# Patient Record
Sex: MALE | Race: WHITE | Employment: FULL TIME | URBAN - METROPOLITAN AREA
[De-identification: names, ages, dates, MRNs, and addresses within clinical notes are randomized per-mention and may not be internally consistent; named-entity substitution may affect disease eponyms.]

---

## 2021-06-30 ENCOUNTER — OFFICE VISIT (OUTPATIENT)
Dept: SURGERY | Age: 76
End: 2021-06-30
Payer: MEDICARE

## 2021-06-30 VITALS
OXYGEN SATURATION: 98 % | HEART RATE: 80 BPM | DIASTOLIC BLOOD PRESSURE: 76 MMHG | BODY MASS INDEX: 32.06 KG/M2 | WEIGHT: 229 LBS | SYSTOLIC BLOOD PRESSURE: 128 MMHG | TEMPERATURE: 97.2 F | HEIGHT: 71 IN | RESPIRATION RATE: 18 BRPM

## 2021-06-30 DIAGNOSIS — K40.90 RIGHT INGUINAL HERNIA: Primary | ICD-10-CM

## 2021-06-30 PROCEDURE — G8432 DEP SCR NOT DOC, RNG: HCPCS | Performed by: SURGERY

## 2021-06-30 PROCEDURE — G8754 DIAS BP LESS 90: HCPCS | Performed by: SURGERY

## 2021-06-30 PROCEDURE — G8752 SYS BP LESS 140: HCPCS | Performed by: SURGERY

## 2021-06-30 PROCEDURE — 99204 OFFICE O/P NEW MOD 45 MIN: CPT | Performed by: SURGERY

## 2021-06-30 PROCEDURE — G8427 DOCREV CUR MEDS BY ELIG CLIN: HCPCS | Performed by: SURGERY

## 2021-06-30 PROCEDURE — G8536 NO DOC ELDER MAL SCRN: HCPCS | Performed by: SURGERY

## 2021-06-30 PROCEDURE — 1101F PT FALLS ASSESS-DOCD LE1/YR: CPT | Performed by: SURGERY

## 2021-06-30 PROCEDURE — G8417 CALC BMI ABV UP PARAM F/U: HCPCS | Performed by: SURGERY

## 2021-06-30 PROCEDURE — 3017F COLORECTAL CA SCREEN DOC REV: CPT | Performed by: SURGERY

## 2021-06-30 NOTE — PROGRESS NOTES
Salomon Glaser is a 76 y.o. male  Chief Complaint   Patient presents with    New Patient     right inguinal hernia         Is someone accompanying this pt? no    Is the patient using any DME equipment during OV? no        Vitals:    06/30/21 1037 06/30/21 1047   BP: 132/82 128/76   Pulse: 80    Resp: 18    Temp: 97.2 °F (36.2 °C)    SpO2: 98%    Weight: 229 lb (103.9 kg)    Height: 5' 11\" (1.803 m)         Depression Screening:  No flowsheet data found. Learning Assessment:  No flowsheet data found. Fall Risk  No flowsheet data found. Health Maintenance reviewed and discussed and ordered per Provider. Coordination of Care:  1. Have you been to the ER, urgent care clinic since your last visit? Hospitalized since your last visit? no    2. Have you seen or consulted any other health care providers outside of the [address] [place] [address] since your last visit? Include any pap smears or colon screening.  no

## 2021-06-30 NOTE — PATIENT INSTRUCTIONS
Inguinal Hernia: Care Instructions  Your Care Instructions     An inguinal hernia occurs when tissue bulges through a weak spot in your groin area. You may see or feel a tender bulge in the groin or scrotum. You may also have pain, pressure or burning, or a feeling that something has \"given way. \"  Hernias are caused by a weakness in the belly wall. The bulge or discomfort may occur after heavy lifting, straining, or coughing. Hernias do not heal on their own, and they tend to get worse over time. If your hernia does not bother you, you most likely can wait to have surgery. Your hernia may get worse, but it may not. In some cases, hernias that are small and painless may never need to be repaired. Follow-up care is a key part of your treatment and safety. Be sure to make and go to all appointments, and call your doctor if you are having problems. It's also a good idea to know your test results and keep a list of the medicines you take. How can you care for yourself at home? · Take pain medicines exactly as directed. ? If the doctor gave you a prescription medicine for pain, take it as prescribed. ? If you are not taking a prescription pain medicine, ask your doctor if you can take an over-the-counter medicine. · Use proper lifting techniques, and avoid heavy lifting if you can. To lift things more safely, bend your knees and let your arms and legs do the work. Keep your back straight, and do not bend over at the waist. Keep the load as close to your body as you can. Move your feet instead of turning or twisting your body. · Lose weight if you are overweight. · Include fruits, vegetables, legumes, and whole grains in your diet each day. These foods are high in fiber and will make it easier to avoid straining during bowel movements. · Do not smoke. Smoking can cause coughing, which can cause your hernia to bulge. If you need help quitting, talk to your doctor about stop-smoking programs and medicines. These can increase your chances of quitting for good. When should you call for help? Call your doctor now or seek immediate medical care if:    · You have new or worse belly pain.     · You are vomiting.     · You cannot pass stools or gas.     · You cannot push the hernia back into place with gentle pressure when you are lying down.     · The area over the hernia turns red or becomes tender. Watch closely for changes in your health, and be sure to contact your doctor if you have any problems. Where can you learn more? Go to http://ta-suri.info/  Enter P533 in the search box to learn more about \"Inguinal Hernia: Care Instructions. \"  Current as of: April 15, 2020               Content Version: 12.8  © 2006-2021 Healthwise, Incorporated. Care instructions adapted under license by [employer] (which disclaims liability or warranty for this information). If you have questions about a medical condition or this instruction, always ask your healthcare professional. [place] [address] any warranty or liability for your use of this information.

## 2021-06-30 NOTE — PROGRESS NOTES
[facility]  General Surgery    Subjective:      HPI: Patient is a very pleasant obese 329 kg/m² 49-year-old male with a past medical history remarkable for hypertension, dyslipidemia, benign prostatic hypertrophy status post transurethral resection of prostate, gastroesophageal reflux disease, prediabetes, lumbago and history of bilateral knee replacements for degenerative joint disease. The patient is referred today by [facility] for evaluation management of right inguinal hernia. Patient states that while he was on the golf course 3 weeks ago he was exiting a sand trap when the sand gave way from under his right foot as he lifted his left leg onto the greens. He felt a sharp pain in had a lot of discomfort in the area. Later in the evening he noticed a bulge. While he was visiting with the urology team to follow-up for his BPH and PSA check he was examined and found to have this right inguinal hernia. No imaging has been performed. The patient is a primary caregiver for his wife with suffer from cerebrovascular accident with left-sided hemiparesis and some short-term memory loss. They do not have any other family in the area.   Patient Active Problem List    Diagnosis Date Noted    Dyslipidemia 10/08/2012    Unspecified essential hypertension 10/08/2012    Personal history of other diseases of digestive system 10/08/2012    GERD (gastroesophageal reflux disease) 10/08/2012    Allergic rhinitis 10/08/2012    Lumbago 10/08/2012    BPH without obstruction/lower urinary tract symptoms 10/08/2012    Impaired fasting glucose 10/08/2012    Microscopic hematuria 10/08/2012    Pinched nerve 10/08/2012     Past Medical History:   Diagnosis Date    Allergic rhinitis     Borderline diabetes mellitus     BPH without obstruction/lower urinary tract symptoms     Dyslipidemia     GERD (gastroesophageal reflux disease)     Impaired fasting glucose     Lumbago     Microscopic hematuria  Personal history of other diseases of digestive system     Pinched nerve     Unspecified essential hypertension       Past Surgical History:   Procedure Laterality Date    HX KNEE REPLACEMENT Bilateral 04,05    HX LYMPHADENECTOMY  2012      No family history on file. Social History     Tobacco Use    Smoking status: Never Smoker    Smokeless tobacco: Never Used    Tobacco comment: cigar rarely   Substance Use Topics    Alcohol use: Yes     Comment: Occ      No Known Allergies    Prior to Admission medications    Medication Sig Start Date End Date Taking? Authorizing Provider   fluticasone propionate (FLONASE) 50 mcg/actuation nasal spray 1 Spray daily. 8/17/20  Yes Provider, Historical   mupirocin (BACTROBAN) 2 % ointment  6/1/21  Yes Provider, Historical   pantoprazole (PROTONIX) 40 mg tablet Take 40 mg by mouth daily. 8/5/19  Yes Provider, Historical   telmisartan-hydroCHLOROthiazide (MICARDIS HCT) 80-25 mg per tablet  3/6/20  Yes Provider, Historical   Januvia 50 mg tablet  2/20/20  Yes Provider, Historical   naproxen (NAPROSYN) 500 mg tablet Take 500 mg by mouth two (2) times daily as needed. 8/5/19  Yes Provider, Historical   fexofenadine (ALLEGRA) 180 mg tablet Take 180 mg by mouth daily as needed. 9/16/19  Yes Provider, Historical   diclofenac (VOLTAREN) 1 % gel Apply 2 g to affected area four (4) times daily. 11/18/16  Yes Provider, Historical   cloNIDine HCL (CATAPRES) 0.1 mg tablet Take 0.1 mg by mouth two (2) times a day. 1/21/20  Yes Provider, Historical   metformin (GLUCOPHAGE) 1,000 mg tablet Take 1,000 mg by mouth daily. Yes Provider, Historical   atorvastatin (LIPITOR) 40 mg tablet Take 40 mg by mouth daily. Yes Provider, Historical   montelukast (SINGULAIR) 10 mg tablet Take 10 mg by mouth daily. Yes Provider, Historical   PV W-O SOFI/FERROUS FUMARATE/FA (M-VIT PO) Take  by mouth. Yes Provider, Historical   ascorbic acid (VITAMIN C) 500 mg tablet Take  by mouth.      Yes Provider, Historical   aspirin 81 mg tablet Take 81 mg by mouth. Yes Provider, Historical   finasteride (PROSCAR) 5 mg tablet Take 5 mg by mouth daily. Patient not taking: Reported on 6/14/2021    Provider, Historical   glucosamine sulfate (GLUCOSAMINE) 500 mg tablet Take  by mouth. Patient not taking: Reported on 6/14/2021    Provider, Historical       Review of Systems:    14 systems were reviewed. The results are as above in the HPI and otherwise negative. Objective:     Vitals:    06/30/21 1037 06/30/21 1047   BP: 132/82 128/76   Pulse: 80    Resp: 18    Temp: 97.2 °F (36.2 °C)    SpO2: 98%    Weight: 103.9 kg (229 lb)    Height: 5' 11\" (1.803 m)        Physical Exam:  GENERAL: alert, cooperative, no distress, appears stated age,   EYE: conjunctivae/corneas clear. PERRL, EOM's intact. THROAT & NECK: normal and no erythema or exudates noted. ,    LYMPHATIC: Cervical, supraclavicular, and axillary nodes normal. ,   LUNG: clear to auscultation bilaterally,   HEART: regular rate and rhythm, S1, S2 normal, no murmur, click, rub or gallop,   ABDOMEN: soft, non-tender. Bowel sounds normal. No masses,  no organomegaly,   Genitalia: Both testes are descended into the scrotum. A hernia defect is palpable with cough on the right. There are some bulging on the left but no clear hernia defect with cough. EXTREMITIES:  extremities normal, atraumatic, no cyanosis or edema,   SKIN: Normal.,   NEUROLOGIC: AOx3. Cranial nerves 2-12 and sensation grossly intact. ,     Data Review:  to be done    Mr. Jus Soto has a reminder for a \"due or due soon\" health maintenance. I have asked that he contact his primary care provider for follow-up on this health maintenance. Impression:     · Patient with right inguinal hernia which is symptomatic.     Plan:     · CT pelvis with IV contrast  · Follow-up in 2 weeks    Signed By: Minal Mendez MD     June 30, 2021

## 2021-07-09 ENCOUNTER — HOSPITAL ENCOUNTER (OUTPATIENT)
Dept: CT IMAGING | Age: 76
Discharge: HOME | End: 2021-07-09
Attending: SURGERY
Payer: MEDICARE

## 2021-07-09 DIAGNOSIS — K40.90 RIGHT INGUINAL HERNIA: ICD-10-CM

## 2021-07-09 LAB — CREAT UR-MCNC: 1.4 MG/DL (ref 0.6–1.3)

## 2021-07-09 PROCEDURE — 72193 CT PELVIS W/DYE: CPT

## 2021-07-09 PROCEDURE — 82565 ASSAY OF CREATININE: CPT

## 2021-07-09 PROCEDURE — 74011000636 HC RX REV CODE- 636: Performed by: SURGERY

## 2021-07-09 RX ADMIN — IOPAMIDOL 80 ML: 612 INJECTION, SOLUTION INTRAVENOUS at 14:36

## 2021-07-13 ENCOUNTER — OFFICE VISIT (OUTPATIENT)
Dept: SURGERY | Age: 76
End: 2021-07-13
Payer: MEDICARE

## 2021-07-13 VITALS
TEMPERATURE: 98 F | BODY MASS INDEX: 32.9 KG/M2 | SYSTOLIC BLOOD PRESSURE: 120 MMHG | RESPIRATION RATE: 18 BRPM | OXYGEN SATURATION: 98 % | HEART RATE: 76 BPM | DIASTOLIC BLOOD PRESSURE: 80 MMHG | WEIGHT: 235 LBS | HEIGHT: 71 IN

## 2021-07-13 DIAGNOSIS — K40.90 RIGHT INGUINAL HERNIA: Primary | ICD-10-CM

## 2021-07-13 PROCEDURE — 3017F COLORECTAL CA SCREEN DOC REV: CPT | Performed by: SURGERY

## 2021-07-13 PROCEDURE — G8432 DEP SCR NOT DOC, RNG: HCPCS | Performed by: SURGERY

## 2021-07-13 PROCEDURE — G8427 DOCREV CUR MEDS BY ELIG CLIN: HCPCS | Performed by: SURGERY

## 2021-07-13 PROCEDURE — 99215 OFFICE O/P EST HI 40 MIN: CPT | Performed by: SURGERY

## 2021-07-13 PROCEDURE — G8754 DIAS BP LESS 90: HCPCS | Performed by: SURGERY

## 2021-07-13 PROCEDURE — G8536 NO DOC ELDER MAL SCRN: HCPCS | Performed by: SURGERY

## 2021-07-13 PROCEDURE — 1101F PT FALLS ASSESS-DOCD LE1/YR: CPT | Performed by: SURGERY

## 2021-07-13 PROCEDURE — G8752 SYS BP LESS 140: HCPCS | Performed by: SURGERY

## 2021-07-13 PROCEDURE — G8417 CALC BMI ABV UP PARAM F/U: HCPCS | Performed by: SURGERY

## 2021-07-13 RX ORDER — SODIUM CHLORIDE 0.9 % (FLUSH) 0.9 %
5-40 SYRINGE (ML) INJECTION EVERY 8 HOURS
Status: CANCELLED | OUTPATIENT
Start: 2021-07-13

## 2021-07-13 RX ORDER — SODIUM CHLORIDE 0.9 % (FLUSH) 0.9 %
5-40 SYRINGE (ML) INJECTION AS NEEDED
Status: CANCELLED | OUTPATIENT
Start: 2021-07-13

## 2021-07-13 NOTE — PROGRESS NOTES
Chief Complaint   Patient presents with    Follow-up     here for ct results right inguinal hernia   1. Have you been to the ER, urgent care clinic since your last visit? Hospitalized since your last visit? No    2. Have you seen or consulted any other health care providers outside of the [address] [place] [address] since your last visit? Include any pap smears or colon screening.  No

## 2021-07-13 NOTE — H&P (VIEW-ONLY)
[facility]  General Surgery    Subjective:      HPI: The patient is a very pleasant 68-year-old male with a past medical history remarkable for obesity with BMI 32.78 kg/m², hypertension, dyslipidemia, diabetes mellitus, lumbago and BPH without obstruction. The patient recently completed a pelvic CT scan which I did review independently on the monitor. The CT scan reveals a right inguinal hernia containing colon with contrast with no evidence of obstruction. No hernia seen on the left. Patient Active Problem List    Diagnosis Date Noted    Dyslipidemia 10/08/2012    Unspecified essential hypertension 10/08/2012    Personal history of other diseases of digestive system 10/08/2012    GERD (gastroesophageal reflux disease) 10/08/2012    Allergic rhinitis 10/08/2012    Lumbago 10/08/2012    BPH without obstruction/lower urinary tract symptoms 10/08/2012    Impaired fasting glucose 10/08/2012    Microscopic hematuria 10/08/2012    Pinched nerve 10/08/2012     Past Medical History:   Diagnosis Date    Allergic rhinitis     Borderline diabetes mellitus     BPH without obstruction/lower urinary tract symptoms     Diabetes ([address]     Dyslipidemia     GERD (gastroesophageal reflux disease)     Impaired fasting glucose     Lumbago     Microscopic hematuria     Personal history of other diseases of digestive system     Pinched nerve     Unspecified essential hypertension       Past Surgical History:   Procedure Laterality Date    HX KNEE REPLACEMENT Bilateral 04,05    HX LYMPHADENECTOMY  2012    HX ORTHOPAEDIC      bilateral carpel tunnel      No family history on file.    Social History     Tobacco Use    Smoking status: Current Some Day Smoker     Types: Cigars    Smokeless tobacco: Never Used    Tobacco comment: cigar rarely   Substance Use Topics    Alcohol use: Yes     Comment: Occ      No Known Allergies    Prior to Admission medications    Medication Sig Start Date End Date Taking? Authorizing Provider   pantoprazole (PROTONIX) 40 mg tablet Take 40 mg by mouth daily. 8/5/19  Yes Provider, Historical   telmisartan-hydroCHLOROthiazide (MICARDIS HCT) 80-25 mg per tablet  3/6/20  Yes Provider, Historical   Januvia 50 mg tablet  2/20/20  Yes Provider, Historical   naproxen (NAPROSYN) 500 mg tablet Take 500 mg by mouth two (2) times daily as needed. 8/5/19  Yes Provider, Historical   fexofenadine (ALLEGRA) 180 mg tablet Take 180 mg by mouth daily as needed. 9/16/19  Yes Provider, Historical   diclofenac (VOLTAREN) 1 % gel Apply 2 g to affected area four (4) times daily. 11/18/16  Yes Provider, Historical   cloNIDine HCL (CATAPRES) 0.1 mg tablet Take 0.1 mg by mouth two (2) times a day. 1/21/20  Yes Provider, Historical   metformin (GLUCOPHAGE) 1,000 mg tablet Take 1,000 mg by mouth daily. Yes Provider, Historical   atorvastatin (LIPITOR) 40 mg tablet Take 40 mg by mouth daily. Yes Provider, Historical   montelukast (SINGULAIR) 10 mg tablet Take 10 mg by mouth daily. Yes Provider, Historical   PV W-O SOFI/FERROUS FUMARATE/FA (M-VIT PO) Take  by mouth. Yes Provider, Historical   ascorbic acid (VITAMIN C) 500 mg tablet Take  by mouth. Yes Provider, Historical   aspirin 81 mg tablet Take 81 mg by mouth. Yes Provider, Historical   fluticasone propionate (FLONASE) 50 mcg/actuation nasal spray 1 Spray daily. Patient not taking: Reported on 7/13/2021 8/17/20   Provider, Historical   mupirocin (BACTROBAN) 2 % ointment  6/1/21   Provider, Historical   finasteride (PROSCAR) 5 mg tablet Take 5 mg by mouth daily. Patient not taking: Reported on 6/14/2021    Provider, Historical   glucosamine sulfate (GLUCOSAMINE) 500 mg tablet Take  by mouth. Patient not taking: Reported on 7/13/2021    Provider, Historical       Review of Systems:    14 systems were reviewed. The results are as above in the HPI and otherwise negative.      Objective:     Vitals:    07/13/21 0844   BP: 120/80   Pulse: 76   Resp: 18   Temp: 98 °F (36.7 °C)   SpO2: 98%   Weight: 106.6 kg (235 lb)   Height: 5' 11\" (1.803 m)       Physical Exam:  GENERAL: alert, cooperative, no distress, appears stated age,   EYE: conjunctivae/corneas clear. PERRL, EOM's intact. THROAT & NECK: normal and no erythema or exudates noted. ,    LYMPHATIC: Cervical, supraclavicular, and axillary nodes normal. ,   LUNG: clear to auscultation bilaterally,   HEART: regular rate and rhythm, S1, S2 normal, no murmur, click, rub or gallop,   ABDOMEN: soft, non distended, nontender. Reducible right inguinal hernia. No evidence of hernia on the left. Bowel sounds normal. No masses,  no organomegaly,   EXTREMITIES:  extremities normal, atraumatic, no cyanosis or edema,   SKIN: Normal.,   NEUROLOGIC: AOx3. Cranial nerves 2-12 and sensation grossly intact. ,     Data Review:  to be done    Mr. [place] has a reminder for a \"due or due soon\" health maintenance. I have asked that he contact his primary care provider for follow-up on this health maintenance. Impression:     · Patient with reducible symptomatic right inguinal hernia.     Plan:     · Robot-assisted laparoscopic right inguinal hernia repair with mesh (transverse abdominis plane block)  · Consent on chart  · Preoperative orders written    Signed By: Lisa Moss MD     July 13, 2021

## 2021-07-13 NOTE — PATIENT INSTRUCTIONS
Inguinal Hernia: Care Instructions  Your Care Instructions     An inguinal hernia occurs when tissue bulges through a weak spot in your groin area. You may see or feel a tender bulge in the groin or scrotum. You may also have pain, pressure or burning, or a feeling that something has \"given way. \"  Hernias are caused by a weakness in the belly wall. The bulge or discomfort may occur after heavy lifting, straining, or coughing. Hernias do not heal on their own, and they tend to get worse over time. If your hernia does not bother you, you most likely can wait to have surgery. Your hernia may get worse, but it may not. In some cases, hernias that are small and painless may never need to be repaired. Follow-up care is a key part of your treatment and safety. Be sure to make and go to all appointments, and call your doctor if you are having problems. It's also a good idea to know your test results and keep a list of the medicines you take. How can you care for yourself at home? · Take pain medicines exactly as directed. ? If the doctor gave you a prescription medicine for pain, take it as prescribed. ? If you are not taking a prescription pain medicine, ask your doctor if you can take an over-the-counter medicine. · Use proper lifting techniques, and avoid heavy lifting if you can. To lift things more safely, bend your knees and let your arms and legs do the work. Keep your back straight, and do not bend over at the waist. Keep the load as close to your body as you can. Move your feet instead of turning or twisting your body. · Lose weight if you are overweight. · Include fruits, vegetables, legumes, and whole grains in your diet each day. These foods are high in fiber and will make it easier to avoid straining during bowel movements. · Do not smoke. Smoking can cause coughing, which can cause your hernia to bulge. If you need help quitting, talk to your doctor about stop-smoking programs and medicines. These can increase your chances of quitting for good. When should you call for help? Call your doctor now or seek immediate medical care if:    · You have new or worse belly pain.     · You are vomiting.     · You cannot pass stools or gas.     · You cannot push the hernia back into place with gentle pressure when you are lying down.     · The area over the hernia turns red or becomes tender. Watch closely for changes in your health, and be sure to contact your doctor if you have any problems. Where can you learn more? Go to http://www.choudhary.com/  Enter R594 in the search box to learn more about \"Inguinal Hernia: Care Instructions. \"  Current as of: April 15, 2020               Content Version: 12.8  © 2006-2021 Healthwise, Incorporated. Care instructions adapted under license by [employer] (which disclaims liability or warranty for this information). If you have questions about a medical condition or this instruction, always ask your healthcare professional. [place] [address] any warranty or liability for your use of this information.

## 2021-07-13 NOTE — PROGRESS NOTES
[facility]  General Surgery    Subjective:      HPI: The patient is a very pleasant 27-year-old male with a past medical history remarkable for obesity with BMI 32.78 kg/m², hypertension, dyslipidemia, diabetes mellitus, lumbago and BPH without obstruction. The patient recently completed a pelvic CT scan which I did review independently on the monitor. The CT scan reveals a right inguinal hernia containing colon with contrast with no evidence of obstruction. No hernia seen on the left. Patient Active Problem List    Diagnosis Date Noted    Dyslipidemia 10/08/2012    Unspecified essential hypertension 10/08/2012    Personal history of other diseases of digestive system 10/08/2012    GERD (gastroesophageal reflux disease) 10/08/2012    Allergic rhinitis 10/08/2012    Lumbago 10/08/2012    BPH without obstruction/lower urinary tract symptoms 10/08/2012    Impaired fasting glucose 10/08/2012    Microscopic hematuria 10/08/2012    Pinched nerve 10/08/2012     Past Medical History:   Diagnosis Date    Allergic rhinitis     Borderline diabetes mellitus     BPH without obstruction/lower urinary tract symptoms     Diabetes ([address]     Dyslipidemia     GERD (gastroesophageal reflux disease)     Impaired fasting glucose     Lumbago     Microscopic hematuria     Personal history of other diseases of digestive system     Pinched nerve     Unspecified essential hypertension       Past Surgical History:   Procedure Laterality Date    HX KNEE REPLACEMENT Bilateral 04,05    HX LYMPHADENECTOMY  2012    HX ORTHOPAEDIC      bilateral carpel tunnel      No family history on file.    Social History     Tobacco Use    Smoking status: Current Some Day Smoker     Types: Cigars    Smokeless tobacco: Never Used    Tobacco comment: cigar rarely   Substance Use Topics    Alcohol use: Yes     Comment: Occ      No Known Allergies    Prior to Admission medications    Medication Sig Start Date End Date Taking? Authorizing Provider   pantoprazole (PROTONIX) 40 mg tablet Take 40 mg by mouth daily. 8/5/19  Yes Provider, Historical   telmisartan-hydroCHLOROthiazide (MICARDIS HCT) 80-25 mg per tablet  3/6/20  Yes Provider, Historical   Januvia 50 mg tablet  2/20/20  Yes Provider, Historical   naproxen (NAPROSYN) 500 mg tablet Take 500 mg by mouth two (2) times daily as needed. 8/5/19  Yes Provider, Historical   fexofenadine (ALLEGRA) 180 mg tablet Take 180 mg by mouth daily as needed. 9/16/19  Yes Provider, Historical   diclofenac (VOLTAREN) 1 % gel Apply 2 g to affected area four (4) times daily. 11/18/16  Yes Provider, Historical   cloNIDine HCL (CATAPRES) 0.1 mg tablet Take 0.1 mg by mouth two (2) times a day. 1/21/20  Yes Provider, Historical   metformin (GLUCOPHAGE) 1,000 mg tablet Take 1,000 mg by mouth daily. Yes Provider, Historical   atorvastatin (LIPITOR) 40 mg tablet Take 40 mg by mouth daily. Yes Provider, Historical   montelukast (SINGULAIR) 10 mg tablet Take 10 mg by mouth daily. Yes Provider, Historical   PV W-O SOFI/FERROUS FUMARATE/FA (M-VIT PO) Take  by mouth. Yes Provider, Historical   ascorbic acid (VITAMIN C) 500 mg tablet Take  by mouth. Yes Provider, Historical   aspirin 81 mg tablet Take 81 mg by mouth. Yes Provider, Historical   fluticasone propionate (FLONASE) 50 mcg/actuation nasal spray 1 Spray daily. Patient not taking: Reported on 7/13/2021 8/17/20   Provider, Historical   mupirocin (BACTROBAN) 2 % ointment  6/1/21   Provider, Historical   finasteride (PROSCAR) 5 mg tablet Take 5 mg by mouth daily. Patient not taking: Reported on 6/14/2021    Provider, Historical   glucosamine sulfate (GLUCOSAMINE) 500 mg tablet Take  by mouth. Patient not taking: Reported on 7/13/2021    Provider, Historical       Review of Systems:    14 systems were reviewed. The results are as above in the HPI and otherwise negative.      Objective:     Vitals:    07/13/21 0844   BP: 120/80   Pulse: 76   Resp: 18   Temp: 98 °F (36.7 °C)   SpO2: 98%   Weight: 106.6 kg (235 lb)   Height: 5' 11\" (1.803 m)       Physical Exam:  GENERAL: alert, cooperative, no distress, appears stated age,   EYE: conjunctivae/corneas clear. PERRL, EOM's intact. THROAT & NECK: normal and no erythema or exudates noted. ,    LYMPHATIC: Cervical, supraclavicular, and axillary nodes normal. ,   LUNG: clear to auscultation bilaterally,   HEART: regular rate and rhythm, S1, S2 normal, no murmur, click, rub or gallop,   ABDOMEN: soft, non distended, nontender. Reducible right inguinal hernia. No evidence of hernia on the left. Bowel sounds normal. No masses,  no organomegaly,   EXTREMITIES:  extremities normal, atraumatic, no cyanosis or edema,   SKIN: Normal.,   NEUROLOGIC: AOx3. Cranial nerves 2-12 and sensation grossly intact. ,     Data Review:  to be done    Mr. Shabbir Haas has a reminder for a \"due or due soon\" health maintenance. I have asked that he contact his primary care provider for follow-up on this health maintenance. Impression:     · Patient with reducible symptomatic right inguinal hernia.     Plan:     · Robot-assisted laparoscopic right inguinal hernia repair with mesh (transverse abdominis plane block)  · Consent on chart  · Preoperative orders written    Signed By: Vivek Barth MD     July 13, 2021

## 2021-07-20 ENCOUNTER — HOSPITAL ENCOUNTER (OUTPATIENT)
Dept: LAB | Age: 76
Discharge: HOME | End: 2021-07-20
Payer: MEDICARE

## 2021-07-20 ENCOUNTER — HOSPITAL ENCOUNTER (OUTPATIENT)
Dept: GENERAL RADIOLOGY | Age: 76
Discharge: HOME | End: 2021-07-20
Payer: MEDICARE

## 2021-07-20 DIAGNOSIS — K40.90 RIGHT INGUINAL HERNIA: ICD-10-CM

## 2021-07-20 LAB
ALBUMIN SERPL-MCNC: 4 G/DL (ref 3.4–5)
ALBUMIN/GLOB SERPL: 1.3 {RATIO} (ref 0.8–1.7)
ALP SERPL-CCNC: 89 U/L (ref 45–117)
ALT SERPL-CCNC: 34 U/L (ref 16–61)
ANION GAP SERPL CALC-SCNC: 4 MMOL/L (ref 3–18)
AST SERPL-CCNC: 16 U/L (ref 10–38)
ATRIAL RATE: 74 BPM
BASOPHILS # BLD: 0.1 K/UL (ref 0–0.1)
BASOPHILS NFR BLD: 1 % (ref 0–2)
BILIRUB SERPL-MCNC: 0.8 MG/DL (ref 0.2–1)
BUN SERPL-MCNC: 20 MG/DL (ref 7–18)
BUN/CREAT SERPL: 15 (ref 12–20)
CALCIUM SERPL-MCNC: 9.6 MG/DL (ref 8.5–10.1)
CALCULATED P AXIS, ECG09: -20 DEGREES
CALCULATED R AXIS, ECG10: -46 DEGREES
CALCULATED T AXIS, ECG11: 8 DEGREES
CHLORIDE SERPL-SCNC: 104 MMOL/L (ref 100–111)
CO2 SERPL-SCNC: 32 MMOL/L (ref 21–32)
CREAT SERPL-MCNC: 1.32 MG/DL (ref 0.6–1.3)
DIAGNOSIS, 93000: NORMAL
DIFFERENTIAL METHOD BLD: ABNORMAL
EOSINOPHIL # BLD: 0.3 K/UL (ref 0–0.4)
EOSINOPHIL NFR BLD: 3 % (ref 0–5)
ERYTHROCYTE [DISTWIDTH] IN BLOOD BY AUTOMATED COUNT: 15.3 % (ref 11.6–14.5)
GLOBULIN SER CALC-MCNC: 3 G/DL (ref 2–4)
GLUCOSE SERPL-MCNC: 103 MG/DL (ref 74–99)
HCT VFR BLD AUTO: 40.9 % (ref 36–48)
HGB BLD-MCNC: 12.3 G/DL (ref 13–16)
LYMPHOCYTES # BLD: 2.6 K/UL (ref 0.9–3.6)
LYMPHOCYTES NFR BLD: 27 % (ref 21–52)
MCH RBC QN AUTO: 25.9 PG (ref 24–34)
MCHC RBC AUTO-ENTMCNC: 30.1 G/DL (ref 31–37)
MCV RBC AUTO: 86.3 FL (ref 74–97)
MONOCYTES # BLD: 1 K/UL (ref 0.05–1.2)
MONOCYTES NFR BLD: 10 % (ref 3–10)
NEUTS SEG # BLD: 5.7 K/UL (ref 1.8–8)
NEUTS SEG NFR BLD: 59 % (ref 40–73)
P-R INTERVAL, ECG05: 184 MS
PLATELET # BLD AUTO: 228 K/UL (ref 135–420)
PMV BLD AUTO: 12.7 FL (ref 9.2–11.8)
POTASSIUM SERPL-SCNC: 4.1 MMOL/L (ref 3.5–5.5)
PROT SERPL-MCNC: 7 G/DL (ref 6.4–8.2)
Q-T INTERVAL, ECG07: 386 MS
QRS DURATION, ECG06: 130 MS
QTC CALCULATION (BEZET), ECG08: 428 MS
RBC # BLD AUTO: 4.74 M/UL (ref 4.35–5.65)
SODIUM SERPL-SCNC: 140 MMOL/L (ref 136–145)
VENTRICULAR RATE, ECG03: 74 BPM
WBC # BLD AUTO: 9.6 K/UL (ref 4.6–13.2)

## 2021-07-20 PROCEDURE — 80053 COMPREHEN METABOLIC PANEL: CPT

## 2021-07-20 PROCEDURE — 36415 COLL VENOUS BLD VENIPUNCTURE: CPT

## 2021-07-20 PROCEDURE — 85025 COMPLETE CBC W/AUTO DIFF WBC: CPT

## 2021-07-20 PROCEDURE — 71046 X-RAY EXAM CHEST 2 VIEWS: CPT

## 2021-07-20 PROCEDURE — 93005 ELECTROCARDIOGRAM TRACING: CPT

## 2021-07-22 RX ORDER — BISMUTH SUBSALICYLATE 262 MG
1 TABLET,CHEWABLE ORAL DAILY
COMMUNITY

## 2021-07-22 RX ORDER — CHOLECALCIFEROL (VITAMIN D3) 125 MCG
CAPSULE ORAL AS NEEDED
COMMUNITY

## 2021-07-22 NOTE — PERIOP NOTES
PRE-SURGICAL INSTRUCTIONS        Patient's Name:  Manas Dillon      Today's Date:  7/22/2021              Surgery Date:  7/29/2021                1. Do NOT eat or drink anything, including candy, gum, or ice chips after midnight on 7/29/21, unless you have specific instructions from your surgeon or anesthesia provider to do so.  2. You may brush your teeth before coming to the hospital.  3. No smoking 24 hours prior to the day of surgery. 4. No alcohol 24 hours prior to the day of surgery. 5. No recreational drugs for one week prior to the day of surgery. 6. Leave all valuables, including money/purse, at home. 7. Remove all jewelry, nail polish, acrylic nails, and makeup (including mascara); no lotions powders, deodorant, or perfume/cologne/after shave on the skin. 8. Glasses/contact lenses and dentures may be worn to the hospital.  They will be removed prior to surgery. 9. Call your doctor if symptoms of a cold or illness develop within 24-48 hours prior to your surgery. 10.  AN ADULT MUST DRIVE YOU HOME AFTER OUTPATIENT SURGERY. 11.  If you are having an outpatient procedure, please make arrangements for a responsible adult to be with you for 24 hours after your surgery. Special Instructions:      Bring list of CURRENT medications. Bring any pertinent legal medical records. Take these medications the morning of surgery with a sip of water:  As directed by MD  Follow physician instructions about stopping anticoagulants. On the day of surgery, come in the main entrance of [facility]. Let the  at the desk know you are there for surgery. A staff member will come escort you to the surgical area on the second floor.     If you have any questions or concerns, please do not hesitate to call:     (Prior to the day of surgery) PAT department:  [phone]   (Day of surgery) Pre-Op department:  [phone]    These surgical instructions were reviewed with Shannen Joseph Enmanuel Pearson during the [employer] call.

## 2021-07-26 RX ORDER — LANOLIN ALCOHOL/MO/W.PET/CERES
CREAM (GRAM) TOPICAL
COMMUNITY

## 2021-07-28 ENCOUNTER — ANESTHESIA EVENT (OUTPATIENT)
Dept: SURGERY | Age: 76
End: 2021-07-28
Payer: MEDICARE

## 2021-07-29 ENCOUNTER — ANESTHESIA (OUTPATIENT)
Dept: SURGERY | Age: 76
End: 2021-07-29
Payer: MEDICARE

## 2021-07-29 ENCOUNTER — HOSPITAL ENCOUNTER (OUTPATIENT)
Age: 76
Discharge: HOME | End: 2021-07-29
Attending: SURGERY | Admitting: SURGERY
Payer: MEDICARE

## 2021-07-29 VITALS
DIASTOLIC BLOOD PRESSURE: 64 MMHG | BODY MASS INDEX: 31.61 KG/M2 | OXYGEN SATURATION: 96 % | RESPIRATION RATE: 20 BRPM | HEIGHT: 71 IN | WEIGHT: 225.8 LBS | HEART RATE: 75 BPM | SYSTOLIC BLOOD PRESSURE: 104 MMHG | TEMPERATURE: 98.2 F

## 2021-07-29 DIAGNOSIS — G89.18 POSTOPERATIVE PAIN: Primary | ICD-10-CM

## 2021-07-29 DIAGNOSIS — K40.90 RIGHT INGUINAL HERNIA: ICD-10-CM

## 2021-07-29 LAB
GLUCOSE BLD STRIP.AUTO-MCNC: 115 MG/DL (ref 70–110)
GLUCOSE BLD STRIP.AUTO-MCNC: 121 MG/DL (ref 70–110)
GLUCOSE BLD STRIP.AUTO-MCNC: 164 MG/DL (ref 70–110)

## 2021-07-29 PROCEDURE — 74011250636 HC RX REV CODE- 250/636: Performed by: NURSE ANESTHETIST, CERTIFIED REGISTERED

## 2021-07-29 PROCEDURE — 74011250636 HC RX REV CODE- 250/636: Performed by: SURGERY

## 2021-07-29 PROCEDURE — 74011250637 HC RX REV CODE- 250/637: Performed by: NURSE ANESTHETIST, CERTIFIED REGISTERED

## 2021-07-29 PROCEDURE — 49650 LAP ING HERNIA REPAIR INIT: CPT | Performed by: SURGERY

## 2021-07-29 PROCEDURE — 77030040922 HC BLNKT HYPOTHRM STRY -A: Performed by: SURGERY

## 2021-07-29 PROCEDURE — 82962 GLUCOSE BLOOD TEST: CPT

## 2021-07-29 PROCEDURE — 77030002933 HC SUT MCRYL J&J -A: Performed by: SURGERY

## 2021-07-29 PROCEDURE — 76060000035 HC ANESTHESIA 2 TO 2.5 HR: Performed by: SURGERY

## 2021-07-29 PROCEDURE — 77030022704 HC SUT VLOC COVD -B: Performed by: SURGERY

## 2021-07-29 PROCEDURE — 77030040361 HC SLV COMPR DVT MDII -B: Performed by: SURGERY

## 2021-07-29 PROCEDURE — 77030031139 HC SUT VCRL2 J&J -A: Performed by: SURGERY

## 2021-07-29 PROCEDURE — 77030018673: Performed by: SURGERY

## 2021-07-29 PROCEDURE — C1781 MESH (IMPLANTABLE): HCPCS | Performed by: SURGERY

## 2021-07-29 PROCEDURE — 77030013079 HC BLNKT BAIR HGGR 3M -A: Performed by: ANESTHESIOLOGY

## 2021-07-29 PROCEDURE — 74011000250 HC RX REV CODE- 250: Performed by: SURGERY

## 2021-07-29 PROCEDURE — 76210000024 HC REC RM PH II 2.5 TO 3 HR: Performed by: SURGERY

## 2021-07-29 PROCEDURE — 77030020703 HC SEAL CANN DISP INTU -B: Performed by: SURGERY

## 2021-07-29 PROCEDURE — 99100 ANES PT EXTEME AGE<1 YR&>70: CPT | Performed by: ANESTHESIOLOGY

## 2021-07-29 PROCEDURE — 77030040830 HC CATH URETH FOL MDII -A: Performed by: SURGERY

## 2021-07-29 PROCEDURE — 2709999900 HC NON-CHARGEABLE SUPPLY: Performed by: SURGERY

## 2021-07-29 PROCEDURE — 77030035277 HC OBTRTR BLDELSS DISP INTU -B: Performed by: SURGERY

## 2021-07-29 PROCEDURE — S2900 ROBOTIC SURGICAL SYSTEM: HCPCS | Performed by: SURGERY

## 2021-07-29 PROCEDURE — 74011000250 HC RX REV CODE- 250: Performed by: NURSE ANESTHETIST, CERTIFIED REGISTERED

## 2021-07-29 PROCEDURE — 74011636637 HC RX REV CODE- 636/637: Performed by: NURSE ANESTHETIST, CERTIFIED REGISTERED

## 2021-07-29 PROCEDURE — 76210000016 HC OR PH I REC 1 TO 1.5 HR: Performed by: SURGERY

## 2021-07-29 PROCEDURE — 77030008756 HC TU IRR SUC STRY -B: Performed by: SURGERY

## 2021-07-29 PROCEDURE — 77030008683 HC TU ET CUF COVD -A: Performed by: ANESTHESIOLOGY

## 2021-07-29 PROCEDURE — 00840 ANES IPER PX LOWER ABD NOS: CPT | Performed by: ANESTHESIOLOGY

## 2021-07-29 PROCEDURE — 76010000876 HC OR TIME 2 TO 2.5HR INTENSV - TIER 2: Performed by: SURGERY

## 2021-07-29 DEVICE — MESH HERN L W10.8XL16CM R INGUINAL WHT POLYPR MFIL: Type: IMPLANTABLE DEVICE | Site: ABDOMEN | Status: FUNCTIONAL

## 2021-07-29 RX ORDER — ROCURONIUM BROMIDE 10 MG/ML
INJECTION, SOLUTION INTRAVENOUS AS NEEDED
Status: DISCONTINUED | OUTPATIENT
Start: 2021-07-29 | End: 2021-07-29 | Stop reason: HOSPADM

## 2021-07-29 RX ORDER — KETOROLAC TROMETHAMINE 15 MG/ML
INJECTION, SOLUTION INTRAMUSCULAR; INTRAVENOUS AS NEEDED
Status: DISCONTINUED | OUTPATIENT
Start: 2021-07-29 | End: 2021-07-29 | Stop reason: HOSPADM

## 2021-07-29 RX ORDER — EPHEDRINE SULFATE/0.9% NACL/PF 50 MG/5 ML
SYRINGE (ML) INTRAVENOUS AS NEEDED
Status: DISCONTINUED | OUTPATIENT
Start: 2021-07-29 | End: 2021-07-29 | Stop reason: HOSPADM

## 2021-07-29 RX ORDER — SODIUM CHLORIDE 0.9 % (FLUSH) 0.9 %
5-40 SYRINGE (ML) INJECTION EVERY 8 HOURS
Status: DISCONTINUED | OUTPATIENT
Start: 2021-07-29 | End: 2021-07-29 | Stop reason: HOSPADM

## 2021-07-29 RX ORDER — DEXTROSE 50 % IN WATER (D50W) INTRAVENOUS SYRINGE
25-50 AS NEEDED
Status: DISCONTINUED | OUTPATIENT
Start: 2021-07-29 | End: 2021-07-29 | Stop reason: HOSPADM

## 2021-07-29 RX ORDER — OXYCODONE HYDROCHLORIDE 5 MG/1
5 TABLET ORAL
Qty: 20 TABLET | Refills: 0 | Status: SHIPPED | OUTPATIENT
Start: 2021-07-29 | End: 2021-08-01

## 2021-07-29 RX ORDER — BUPIVACAINE HYDROCHLORIDE AND EPINEPHRINE 5; 5 MG/ML; UG/ML
INJECTION, SOLUTION EPIDURAL; INTRACAUDAL; PERINEURAL AS NEEDED
Status: DISCONTINUED | OUTPATIENT
Start: 2021-07-29 | End: 2021-07-29 | Stop reason: HOSPADM

## 2021-07-29 RX ORDER — LIDOCAINE HYDROCHLORIDE 20 MG/ML
INJECTION, SOLUTION EPIDURAL; INFILTRATION; INTRACAUDAL; PERINEURAL AS NEEDED
Status: DISCONTINUED | OUTPATIENT
Start: 2021-07-29 | End: 2021-07-29 | Stop reason: HOSPADM

## 2021-07-29 RX ORDER — ONDANSETRON 2 MG/ML
4 INJECTION INTRAMUSCULAR; INTRAVENOUS ONCE
Status: DISCONTINUED | OUTPATIENT
Start: 2021-07-29 | End: 2021-07-29 | Stop reason: HOSPADM

## 2021-07-29 RX ORDER — HYDROMORPHONE HYDROCHLORIDE 1 MG/ML
0.5 INJECTION, SOLUTION INTRAMUSCULAR; INTRAVENOUS; SUBCUTANEOUS
Status: DISCONTINUED | OUTPATIENT
Start: 2021-07-29 | End: 2021-07-29 | Stop reason: HOSPADM

## 2021-07-29 RX ORDER — DOCUSATE SODIUM 100 MG/1
100 CAPSULE, LIQUID FILLED ORAL 2 TIMES DAILY
Qty: 60 CAPSULE | Refills: 2 | Status: SHIPPED | OUTPATIENT
Start: 2021-07-29 | End: 2021-09-15 | Stop reason: ALTCHOICE

## 2021-07-29 RX ORDER — ACETAMINOPHEN 325 MG/1
650 TABLET ORAL EVERY 6 HOURS
Qty: 42 TABLET | Refills: 0 | Status: SHIPPED | OUTPATIENT
Start: 2021-07-29

## 2021-07-29 RX ORDER — ROPIVACAINE HYDROCHLORIDE 2 MG/ML
INJECTION, SOLUTION EPIDURAL; INFILTRATION; PERINEURAL
Status: DISCONTINUED
Start: 2021-07-29 | End: 2021-07-29 | Stop reason: WASHOUT

## 2021-07-29 RX ORDER — SODIUM CHLORIDE, SODIUM LACTATE, POTASSIUM CHLORIDE, CALCIUM CHLORIDE 600; 310; 30; 20 MG/100ML; MG/100ML; MG/100ML; MG/100ML
50 INJECTION, SOLUTION INTRAVENOUS CONTINUOUS
Status: DISCONTINUED | OUTPATIENT
Start: 2021-07-29 | End: 2021-07-29 | Stop reason: HOSPADM

## 2021-07-29 RX ORDER — SODIUM CHLORIDE 0.9 % (FLUSH) 0.9 %
5-40 SYRINGE (ML) INJECTION AS NEEDED
Status: DISCONTINUED | OUTPATIENT
Start: 2021-07-29 | End: 2021-07-29 | Stop reason: HOSPADM

## 2021-07-29 RX ORDER — FAMOTIDINE 20 MG/1
20 TABLET, FILM COATED ORAL ONCE
Status: COMPLETED | OUTPATIENT
Start: 2021-07-29 | End: 2021-07-29

## 2021-07-29 RX ORDER — MAGNESIUM SULFATE HEPTAHYDRATE 40 MG/ML
INJECTION, SOLUTION INTRAVENOUS AS NEEDED
Status: DISCONTINUED | OUTPATIENT
Start: 2021-07-29 | End: 2021-07-29 | Stop reason: HOSPADM

## 2021-07-29 RX ORDER — ONDANSETRON 2 MG/ML
INJECTION INTRAMUSCULAR; INTRAVENOUS AS NEEDED
Status: DISCONTINUED | OUTPATIENT
Start: 2021-07-29 | End: 2021-07-29 | Stop reason: HOSPADM

## 2021-07-29 RX ORDER — FENTANYL CITRATE 50 UG/ML
INJECTION, SOLUTION INTRAMUSCULAR; INTRAVENOUS AS NEEDED
Status: DISCONTINUED | OUTPATIENT
Start: 2021-07-29 | End: 2021-07-29 | Stop reason: HOSPADM

## 2021-07-29 RX ORDER — DEXMEDETOMIDINE HYDROCHLORIDE 4 UG/ML
INJECTION, SOLUTION INTRAVENOUS AS NEEDED
Status: DISCONTINUED | OUTPATIENT
Start: 2021-07-29 | End: 2021-07-29 | Stop reason: HOSPADM

## 2021-07-29 RX ORDER — SUCCINYLCHOLINE CHLORIDE 20 MG/ML
INJECTION INTRAMUSCULAR; INTRAVENOUS AS NEEDED
Status: DISCONTINUED | OUTPATIENT
Start: 2021-07-29 | End: 2021-07-29 | Stop reason: HOSPADM

## 2021-07-29 RX ORDER — GLYCOPYRROLATE 0.2 MG/ML
INJECTION INTRAMUSCULAR; INTRAVENOUS AS NEEDED
Status: DISCONTINUED | OUTPATIENT
Start: 2021-07-29 | End: 2021-07-29 | Stop reason: HOSPADM

## 2021-07-29 RX ORDER — INSULIN LISPRO 100 [IU]/ML
INJECTION, SOLUTION INTRAVENOUS; SUBCUTANEOUS ONCE
Status: COMPLETED | OUTPATIENT
Start: 2021-07-29 | End: 2021-07-29

## 2021-07-29 RX ORDER — MAGNESIUM SULFATE 100 %
4 CRYSTALS MISCELLANEOUS AS NEEDED
Status: DISCONTINUED | OUTPATIENT
Start: 2021-07-29 | End: 2021-07-29 | Stop reason: HOSPADM

## 2021-07-29 RX ORDER — KETAMINE HCL 50MG/ML(1)
SYRINGE (ML) INTRAVENOUS AS NEEDED
Status: DISCONTINUED | OUTPATIENT
Start: 2021-07-29 | End: 2021-07-29 | Stop reason: HOSPADM

## 2021-07-29 RX ORDER — PROPOFOL 10 MG/ML
INJECTION, EMULSION INTRAVENOUS AS NEEDED
Status: DISCONTINUED | OUTPATIENT
Start: 2021-07-29 | End: 2021-07-29 | Stop reason: HOSPADM

## 2021-07-29 RX ORDER — HYDROMORPHONE HYDROCHLORIDE 1 MG/ML
0.2 INJECTION, SOLUTION INTRAMUSCULAR; INTRAVENOUS; SUBCUTANEOUS AS NEEDED
Status: DISCONTINUED | OUTPATIENT
Start: 2021-07-29 | End: 2021-07-29 | Stop reason: HOSPADM

## 2021-07-29 RX ORDER — LIDOCAINE HYDROCHLORIDE 10 MG/ML
0.1 INJECTION, SOLUTION EPIDURAL; INFILTRATION; INTRACAUDAL; PERINEURAL AS NEEDED
Status: DISCONTINUED | OUTPATIENT
Start: 2021-07-29 | End: 2021-07-29 | Stop reason: HOSPADM

## 2021-07-29 RX ORDER — NEOSTIGMINE METHYLSULFATE 1 MG/ML
INJECTION, SOLUTION INTRAVENOUS AS NEEDED
Status: DISCONTINUED | OUTPATIENT
Start: 2021-07-29 | End: 2021-07-29 | Stop reason: HOSPADM

## 2021-07-29 RX ADMIN — DEXMEDETOMIDINE HYDROCHLORIDE 12 MCG: 100 INJECTION, SOLUTION, CONCENTRATE INTRAVENOUS at 13:46

## 2021-07-29 RX ADMIN — GLYCOPYRROLATE 0.4 MG: 0.2 INJECTION INTRAMUSCULAR; INTRAVENOUS at 14:38

## 2021-07-29 RX ADMIN — Medication 3 MG: at 14:38

## 2021-07-29 RX ADMIN — ROCURONIUM BROMIDE 30 MG: 50 INJECTION INTRAVENOUS at 13:06

## 2021-07-29 RX ADMIN — FENTANYL CITRATE 50 MCG: 50 INJECTION, SOLUTION INTRAMUSCULAR; INTRAVENOUS at 14:31

## 2021-07-29 RX ADMIN — FAMOTIDINE 20 MG: 20 TABLET ORAL at 12:07

## 2021-07-29 RX ADMIN — SODIUM CHLORIDE, SODIUM LACTATE, POTASSIUM CHLORIDE, AND CALCIUM CHLORIDE 50 ML/HR: 600; 310; 30; 20 INJECTION, SOLUTION INTRAVENOUS at 11:52

## 2021-07-29 RX ADMIN — FENTANYL CITRATE 50 MCG: 50 INJECTION, SOLUTION INTRAMUSCULAR; INTRAVENOUS at 14:28

## 2021-07-29 RX ADMIN — DEXMEDETOMIDINE HYDROCHLORIDE 10 MCG: 100 INJECTION, SOLUTION, CONCENTRATE INTRAVENOUS at 14:02

## 2021-07-29 RX ADMIN — FENTANYL CITRATE 50 MCG: 50 INJECTION, SOLUTION INTRAMUSCULAR; INTRAVENOUS at 14:43

## 2021-07-29 RX ADMIN — KETOROLAC TROMETHAMINE 15 MG: 15 INJECTION, SOLUTION INTRAMUSCULAR; INTRAVENOUS at 14:37

## 2021-07-29 RX ADMIN — DEXMEDETOMIDINE HYDROCHLORIDE 10 MCG: 100 INJECTION, SOLUTION, CONCENTRATE INTRAVENOUS at 14:26

## 2021-07-29 RX ADMIN — ROCURONIUM BROMIDE 10 MG: 50 INJECTION INTRAVENOUS at 14:23

## 2021-07-29 RX ADMIN — DEXMEDETOMIDINE HYDROCHLORIDE 8 MCG: 100 INJECTION, SOLUTION, CONCENTRATE INTRAVENOUS at 13:49

## 2021-07-29 RX ADMIN — SUCCINYLCHOLINE CHLORIDE 100 MG: 20 INJECTION, SOLUTION INTRAMUSCULAR; INTRAVENOUS at 12:51

## 2021-07-29 RX ADMIN — WATER 2 G: 1 INJECTION INTRAMUSCULAR; INTRAVENOUS; SUBCUTANEOUS at 13:03

## 2021-07-29 RX ADMIN — DEXMEDETOMIDINE HYDROCHLORIDE 10 MCG: 100 INJECTION, SOLUTION, CONCENTRATE INTRAVENOUS at 14:23

## 2021-07-29 RX ADMIN — LIDOCAINE HYDROCHLORIDE 100 MG: 20 INJECTION, SOLUTION EPIDURAL; INFILTRATION; INTRACAUDAL; PERINEURAL at 12:51

## 2021-07-29 RX ADMIN — DEXMEDETOMIDINE HYDROCHLORIDE 6 MCG: 100 INJECTION, SOLUTION, CONCENTRATE INTRAVENOUS at 13:33

## 2021-07-29 RX ADMIN — FENTANYL CITRATE 50 MCG: 50 INJECTION, SOLUTION INTRAMUSCULAR; INTRAVENOUS at 13:07

## 2021-07-29 RX ADMIN — DEXMEDETOMIDINE HYDROCHLORIDE 10 MCG: 100 INJECTION, SOLUTION, CONCENTRATE INTRAVENOUS at 13:52

## 2021-07-29 RX ADMIN — Medication 50 MG: at 12:51

## 2021-07-29 RX ADMIN — DEXMEDETOMIDINE HYDROCHLORIDE 10 MCG: 100 INJECTION, SOLUTION, CONCENTRATE INTRAVENOUS at 13:40

## 2021-07-29 RX ADMIN — DEXMEDETOMIDINE HYDROCHLORIDE 12 MCG: 100 INJECTION, SOLUTION, CONCENTRATE INTRAVENOUS at 13:38

## 2021-07-29 RX ADMIN — DEXMEDETOMIDINE HYDROCHLORIDE 8 MCG: 100 INJECTION, SOLUTION, CONCENTRATE INTRAVENOUS at 13:36

## 2021-07-29 RX ADMIN — MAGNESIUM SULFATE 2 G: 2 INJECTION INTRAVENOUS at 13:41

## 2021-07-29 RX ADMIN — Medication 10 MG: at 13:26

## 2021-07-29 RX ADMIN — DEXMEDETOMIDINE HYDROCHLORIDE 4 MCG: 100 INJECTION, SOLUTION, CONCENTRATE INTRAVENOUS at 13:32

## 2021-07-29 RX ADMIN — INSULIN LISPRO 3 UNITS: 100 INJECTION, SOLUTION INTRAVENOUS; SUBCUTANEOUS at 15:13

## 2021-07-29 RX ADMIN — DEXMEDETOMIDINE HYDROCHLORIDE 10 MCG: 100 INJECTION, SOLUTION, CONCENTRATE INTRAVENOUS at 13:43

## 2021-07-29 RX ADMIN — DEXMEDETOMIDINE HYDROCHLORIDE 10 MCG: 100 INJECTION, SOLUTION, CONCENTRATE INTRAVENOUS at 13:55

## 2021-07-29 RX ADMIN — DEXMEDETOMIDINE HYDROCHLORIDE 10 MCG: 100 INJECTION, SOLUTION, CONCENTRATE INTRAVENOUS at 12:52

## 2021-07-29 RX ADMIN — GLYCOPYRROLATE 0.2 MG: 0.2 INJECTION INTRAMUSCULAR; INTRAVENOUS at 13:14

## 2021-07-29 RX ADMIN — DEXMEDETOMIDINE HYDROCHLORIDE 10 MCG: 100 INJECTION, SOLUTION, CONCENTRATE INTRAVENOUS at 13:58

## 2021-07-29 RX ADMIN — ONDANSETRON 4 MG: 2 INJECTION INTRAMUSCULAR; INTRAVENOUS at 12:51

## 2021-07-29 RX ADMIN — PROPOFOL 120 MG: 10 INJECTION, EMULSION INTRAVENOUS at 12:51

## 2021-07-29 NOTE — OP NOTES
[address] [place] [address] [facility],  [facility]                                 OPERATIVE REPORT    PATIENT:    Marlyn Rodriguez  MRN:            [id number]      DATE:  2021  BILLIN  ROOM:        @BED@  ATTENDING:   Kwesi Garcia MD  DICTATING:   Kwesi Garcia MD    PREOPERATIVE DIAGNOSIS : Right inguinal hernia  POSTOPERATIVE DIAGNOSIS:  indirect right inguinal hernia containing colon and lipoma  PROCEDURES PERFORMED: Robot-assisted laparoscopic right inguinal hernia  repair with mesh. ESTIMATED BLOOD LOSS: 20 mL. FLUIDS GIVEN:  1000 mL. URINE OUTPUT: 100 mL. SPECIMENS REMOVED: None. SURGEON: Eric Moore MD   ASSISTANT: Nikos Ward SA  ANESTHESIA: General and local (0.5% Marcaine with epinephrine). FINDINGS: right inguinal hernia   IMPLANT: Bard 3D Max right large  OPERATIVE INDICATION: Patient with symptomatic right inguinal hernias  DESCRIPTION OF PROCEDURE: The patient was identified in the holding area, where consent for robot-assisted laparoscopic right inguinal   hernia repair with mesh was verified. In the operating room, the patient   was placed under general anesthesia. Grace catheter was inserted. The   abdomen was prepped and draped in sterile fashion using chlorhexidine   solution and sterile drapes. The time-out was performed to ensure correct   procedure. The local anesthetic was infiltrated into the skin and deep   dermal tissues at each proposed trocar site prior to the incision. The   initial incision was placed above the umbilicus. This was a  9 mm incision   to accommodate the 8 mm trocar and scope. The Visiport system was used to   safely enter the peritoneal cavity. Pneumoperitoneum was obtained using   carbon dioxide gas to 15 mmHg. The trocar in the left mid clavicular line   was then placed safely into the peritoneal cavity. This was an 8 mm robotic   trocar. The second 8 mm trocar was placed slightly cephalad and in the mid   clavicular line on the right. The patient was placed in Trendelenburg. The robot was then docked and the robotic instruments were safely inserted   into place under direct visualization. The peritoneum was then incised   using electrocautery at the right anterior superior iliac spine. This dissection was carried laterally from the ASIS, medially to the midline. The peritoneum was then reflected downward. The hernia sac was bluntly dissected away from the cord and cord structures. Colon and a large amount of lipoma were present. The vasculature of the cord was densely adherent to the testicle. The hernia sac was then dissected away from the vasculature. The Bard 3D Max large right mesh was inserted through the right-sided trocar. Safe entry into the peritoneal cavity was visualized. The 0- Vicryl and two 2 - 0 V-Loc suture were also inserted through this trocar. The SutureCut needle    was then inserted into the right trocar. The mesh was positioned and   then sutured with a stitch in Kevin's ligament and medial in the upper   center of the mesh for fixation. The suture was inserted and the peritoneum was closed in a running fashion. The mesh set nicely against the abdominal wall. The peritoneal   closure was tension free. The three needles were removed from the peritoneal cavity. The trocars were removed under direct visualization. Pneumoperitoneum was allowed to deflate. A 4-0 Monocryl suture was used to close the skin at each trocar site. Mastisol, steristrips and bandaids were used for dressings. The patient tolerated the procedure very well. DISPOSITION: He was stable with the Grace removed, extubated upon transport   to the recovery room.

## 2021-07-29 NOTE — INTERVAL H&P NOTE
Update History & Physical    The Patient's History and Physical of July 13, 2021 was reviewed with the patient and I examined the patient. There was no change. The surgical site was confirmed by the patient and me. Plan:  The risk, benefits, expected outcome, and alternative to the recommended procedure have been discussed with the patient. Patient understands and wants to proceed with the procedure.     Electronically signed by Erlinda Danielle MD on 7/29/2021 at 11:58 AM

## 2021-07-29 NOTE — ANESTHESIA PREPROCEDURE EVALUATION
Relevant Problems   CARDIOVASCULAR   (+) Unspecified essential hypertension      GASTROINTESTINAL   (+) GERD (gastroesophageal reflux disease)       Anesthetic History   No history of anesthetic complications            Review of Systems / Medical History  Patient summary reviewed and pertinent labs reviewed    Pulmonary                   Neuro/Psych              Cardiovascular    Hypertension          PAD      Comments: AAA   GI/Hepatic/Renal     GERD           Endo/Other    Diabetes: type 2         Other Findings              Physical Exam    Airway  Mallampati: III  TM Distance: 4 - 6 cm  Neck ROM: decreased range of motion   Mouth opening: Diminished (comment)     Cardiovascular    Rhythm: regular  Rate: normal         Dental    Dentition: Poor dentition     Pulmonary  Breath sounds clear to auscultation               Abdominal  GI exam deferred       Other Findings            Anesthetic Plan    ASA: 3  Anesthesia type: general      Post-op pain plan if not by surgeon: peripheral nerve block single    Induction: Intravenous  Anesthetic plan and risks discussed with: Patient

## 2021-07-29 NOTE — DISCHARGE INSTRUCTIONS
[facility]  Rodriguez Guadarrama MD, FACS  General Surgery    Pt may remove the dressing and shower in two days. Allow soap and water to run over the incision. No driving or operating heavy machinery while on narcotic pain medications. Please apply an ice pack to the operative site for 30 minutes 3 times daily to help reduce pain and swelling and the need for narcotic pain medication. No strenuous activity or contact sports for two weeks. No lifting greater than 15 lbs for 2 weeks. Call MD for any redness, swelling, bleeding or pus at the incision. Also call for any nausea, vomiting, increased pain or pain uncontrolled by pain medicine. Patient Education        Inguinal Hernia Repair Surgery: What to Expect at Home  Your Recovery     After surgery to repair a hernia, you're likely to have pain for a few days. You may also feel tired and have less energy than normal. This is common. You should start to feel better after a few days. And you'll probably feel much better in 7 days. For a few weeks you may feel discomfort or pulling in the groin area when you move. You may have some bruising near the repair site and on your genitals. This is normal.  This care sheet gives you a general idea about how long it will take for you to recover. But each person recovers at a different pace. Follow the steps below to get better as quickly as possible. How can you care for yourself at home? Activity    · Rest when you feel tired.     · You may shower 24 to 48 hours after surgery, if your doctor okays it. Pat the incision dry. Do not take a bath for the first 2 weeks, or until your doctor tells you it is okay.     · Allow the area to heal. Don't move quickly or lift anything heavy until you are feeling better.     · Be active. Walking is a good choice.     · You most likely can return to light activity after 1 to 3 weeks, depending on the type of surgery you had.    Diet    · You can eat your normal diet. If your stomach is upset, try bland, low-fat foods like plain rice, broiled chicken, toast, and yogurt.     · If your bowel movements are not regular right after surgery, try to avoid constipation and straining. Drink plenty of water. Your doctor may suggest fiber, a stool softener, or a mild laxative. Medicines    · Be safe with medicines. Read and follow all instructions on the label. ? If the doctor gave you a prescription medicine for pain, take it as prescribed. ? If you are not taking a prescription pain medicine, ask your doctor if you can take an over-the-counter medicine.     · Your doctor will tell you if and when you can restart your medicines. He or she will also give you instructions about taking any new medicines. Incision care    · You will have a dressing over the cut (incision). A dressing helps the cut heal and protects it. Your doctor will tell you how to take care of this.     · If you have skin adhesive on the cut, leave it on until it falls off. Skin adhesive is also called liquid stitches or glue.     · If you have strips of tape on the cut, leave the tape on for a week or until it falls off.     · If you had stitches, your doctor will tell you when to come back to have them removed.     · Wash the area daily with warm, soapy water, and pat it dry. Don't use hydrogen peroxide or alcohol. They can slow healing. Ice    · Put ice or a cold pack on the area for 10 to 20 minutes at a time. Try to do this every 1 to 2 hours for the next 3 days (when you are awake) or until the swelling goes down. Put a thin cloth between the ice and your skin. Follow-up care is a key part of your treatment and safety. Be sure to make and go to all appointments, and call your doctor if you are having problems. It's also a good idea to know your test results and keep a list of the medicines you take. When should you call for help? Call 911 anytime you think you may need emergency care.  For example, call if:    · You passed out (lost consciousness).     · You are short of breath. Call your doctor now or seek immediate medical care if:    · You have pain that does not get better after you take pain medicine.     · You have loose stitches, or your incision comes open.     · Bright red blood has soaked through your bandage.     · You are sick to your stomach or cannot drink fluids.     · You have signs of a blood clot in your leg (called a deep vein thrombosis), such as:  ? Pain in your calf, back of the knee, thigh, or groin. ? Redness and swelling in your leg or groin.     · You cannot pass stools or gas.     · You have symptoms of infection, such as:  ? Increased pain, swelling, warmth, or redness. ? Red streaks leading from the incision. ? Pus draining from the incision. ? A fever. Watch closely for changes in your health, and be sure to contact your doctor if you have any problems. Where can you learn more? Go to http://www.gray.com/  Enter D758 in the search box to learn more about \"Inguinal Hernia Repair Surgery: What to Expect at Home. \"  Current as of: April 15, 2020               Content Version: 12.8  © 7099-3887 [employer]. Care instructions adapted under license by [employer] (which disclaims liability or warranty for this information). If you have questions about a medical condition or this instruction, always ask your healthcare professional. [place] [address] any warranty or liability for your use of this information.          DISCHARGE SUMMARY from Nurse    PATIENT INSTRUCTIONS:    After general anesthesia or intravenous sedation, for 24 hours or while taking prescription Narcotics:  · Limit your activities  · Do not drive and operate hazardous machinery  · Do not make important personal or business decisions  · Do  not drink alcoholic beverages  · If you have not urinated within 8 hours after discharge, please contact your surgeon on call. Report the following to your surgeon:  · Excessive pain, swelling, redness or odor of or around the surgical area  · Temperature over 100.5  · Nausea and vomiting lasting longer than 4 hours or if unable to take medications  · Any signs of decreased circulation or nerve impairment to extremity: change in color, persistent  numbness, tingling, coldness or increase pain  · Any questions    What to do at Home:  Recommended activity: Activity as tolerated and no driving for today. *  Please give a list of your current medications to your Primary Care Provider. *  Please update this list whenever your medications are discontinued, doses are      changed, or new medications (including over-the-counter products) are added. *  Please carry medication information at all times in case of emergency situations. These are general instructions for a healthy lifestyle:    No smoking/ No tobacco products/ Avoid exposure to second hand smoke  Surgeon General's Warning:  Quitting smoking now greatly reduces serious risk to your health. Obesity, smoking, and sedentary lifestyle greatly increases your risk for illness    A healthy diet, regular physical exercise & weight monitoring are important for maintaining a healthy lifestyle    You may be retaining fluid if you have a history of heart failure or if you experience any of the following symptoms:  Weight gain of 3 pounds or more overnight or 5 pounds in a week, increased swelling in our hands or feet or shortness of breath while lying flat in bed. Please call your doctor as soon as you notice any of these symptoms; do not wait until your next office visit. The discharge information has been reviewed with the patient. The patient verbalized understanding.   Discharge medications reviewed with the patient and appropriate educational materials and side effects teaching were provided.   ___________________________________________________________________________________________________________________________________

## 2021-07-29 NOTE — DISCHARGE SUMMARY
4 Carmelita Burks MD, [facility]  General Surgery  Discharge Summary     Patient ID:  Suzanne Barragan  [id number]  41 y.o.  1945    Admit Date: 7/29/2021    Discharge Date: 7/29/2021    Admission Diagnoses: Right inguinal hernia [K40.90]    Discharge Diagnoses:    Problem List as of 7/29/2021 Date Reviewed: 7/29/2021        Codes Class Noted - Resolved    Right inguinal hernia ICD-10-CM: K40.90  ICD-9-CM: 550.90  7/29/2021 - Present        Dyslipidemia ICD-10-CM: E78.5  ICD-9-CM: 272.4  10/8/2012 - Present        Unspecified essential hypertension ICD-10-CM: I10  ICD-9-CM: 401.9  10/8/2012 - Present        Personal history of other diseases of digestive system ICD-10-CM: Z87.19  ICD-9-CM: V12.79  10/8/2012 - Present        GERD (gastroesophageal reflux disease) ICD-10-CM: K21.9  ICD-9-CM: 530.81  10/8/2012 - Present        Allergic rhinitis ICD-10-CM: J30.9  ICD-9-CM: 477.9  10/8/2012 - Present        Lumbago ICD-10-CM: M54.5  ICD-9-CM: 724.2  10/8/2012 - Present        BPH without obstruction/lower urinary tract symptoms ICD-10-CM: N40.0  ICD-9-CM: 600.00  10/8/2012 - Present        Impaired fasting glucose ICD-10-CM: R73.01  ICD-9-CM: 790.21  10/8/2012 - Present        Microscopic hematuria ICD-10-CM: R31.29  ICD-9-CM: 599.72  10/8/2012 - Present        Pinched nerve ICD-10-CM: G58.9  ICD-9-CM: 355.9  10/8/2012 - Present    Overview Signed 10/8/2012  9:16 AM by Elfredia Screen     In c4                    Admission Condition: Good    Discharge Condition: Good    Last Procedure: Procedure(s):  ROBOTIC ASSISTED LAPAROSCOPIC RIGHT [address] Course:   Normal hospital course for this procedure.     Consults: None    Significant Diagnostic Studies: None    Disposition: home    Patient Instructions:   Current Discharge Medication List      START taking these medications    Details   oxyCODONE IR (ROXICODONE) 5 mg immediate release tablet Take 1 Tablet by mouth every four (4) hours as needed for Pain for up to 3 days. Max Daily Amount: 30 mg.  Qty: 20 Tablet, Refills: 0    Associated Diagnoses: Postoperative pain; Right inguinal hernia      acetaminophen (TYLENOL) 325 mg tablet Take 2 Tablets by mouth every six (6) hours. Indications: pain  Qty: 42 Tablet, Refills: 0      bisacodyL 5 mg tab Take 5 mg by mouth daily. Qty: 3 Tablet, Refills: 0      docusate sodium (COLACE) 100 mg capsule Take 1 Capsule by mouth two (2) times a day for 90 days. Qty: 60 Capsule, Refills: 2         CONTINUE these medications which have NOT CHANGED    Details   COQ10, UBIQUINOL, PO Take  by mouth daily. ferrous sulfate 325 mg (65 mg iron) tablet Take  by mouth Daily (before breakfast). naproxen sodium (Aleve) 220 mg cap Take  by mouth as needed. multivitamin (ONE A DAY) tablet Take 1 Tablet by mouth daily. fluticasone propionate (FLONASE) 50 mcg/actuation nasal spray 1 Spray daily as needed. pantoprazole (PROTONIX) 40 mg tablet Take 40 mg by mouth daily. telmisartan-hydroCHLOROthiazide (MICARDIS HCT) 80-25 mg per tablet 1 Tablet daily. Januvia 50 mg tablet Take 50 mg by mouth daily. naproxen (NAPROSYN) 500 mg tablet Take 500 mg by mouth two (2) times daily as needed. fexofenadine (ALLEGRA) 180 mg tablet Take 180 mg by mouth daily as needed. diclofenac (VOLTAREN) 1 % gel Apply 2 g to affected area four (4) times daily as needed. cloNIDine HCL (CATAPRES) 0.1 mg tablet Take 0.1 mg by mouth two (2) times a day. metformin (GLUCOPHAGE) 1,000 mg tablet Take 1,000 mg by mouth two (2) times daily (with meals). atorvastatin (LIPITOR) 40 mg tablet Take 40 mg by mouth nightly. montelukast (SINGULAIR) 10 mg tablet Take 10 mg by mouth nightly as needed. Takes 1/2 tab as needed      ascorbic acid (VITAMIN C) 500 mg tablet Take 500 mg by mouth nightly. aspirin 81 mg tablet Take 81 mg by mouth.              Activity: See surgical instructions  Diet: Low fat, Low cholesterol  Wound Care: As directed    Follow-up with Dr. Sameera Doan in 2 weeks.   Follow-up tests/labs None    Signed:  Maria Isabel Marino MD  7/29/2021  2:51 PM

## 2021-07-29 NOTE — INTERVAL H&P NOTE
Update History & Physical    The Patient's History and Physical of July 13, 2021 was reviewed with the patient and I examined the patient. There was no change. The surgical site was confirmed by the patient and me. Plan:  The risk, benefits, expected outcome, and alternative to the recommended procedure have been discussed with the patient. Patient understands and wants to proceed with the procedure.     Electronically signed by Eb Conner MD on 7/29/2021 at 11:58 AM

## 2021-07-30 ENCOUNTER — HOSPITAL ENCOUNTER (EMERGENCY)
Age: 76
Discharge: HOME | End: 2021-07-30
Payer: MEDICARE

## 2021-07-30 VITALS
HEART RATE: 88 BPM | OXYGEN SATURATION: 97 % | DIASTOLIC BLOOD PRESSURE: 97 MMHG | RESPIRATION RATE: 18 BRPM | TEMPERATURE: 98.8 F | SYSTOLIC BLOOD PRESSURE: 150 MMHG

## 2021-07-30 DIAGNOSIS — Z87.19 STATUS POST HERNIA REPAIR: ICD-10-CM

## 2021-07-30 DIAGNOSIS — R34 DECREASED URINE OUTPUT: Primary | ICD-10-CM

## 2021-07-30 DIAGNOSIS — Z98.890 STATUS POST HERNIA REPAIR: ICD-10-CM

## 2021-07-30 LAB
APPEARANCE UR: CLEAR
BACTERIA URNS QL MICRO: NEGATIVE /HPF
BILIRUB UR QL: NEGATIVE
COLOR UR: YELLOW
EPITH CASTS URNS QL MICRO: NORMAL /LPF (ref 0–5)
GLUCOSE BLD STRIP.AUTO-MCNC: 116 MG/DL (ref 70–110)
GLUCOSE UR STRIP.AUTO-MCNC: NEGATIVE MG/DL
HGB UR QL STRIP: NEGATIVE
KETONES UR QL STRIP.AUTO: NEGATIVE MG/DL
LEUKOCYTE ESTERASE UR QL STRIP.AUTO: NEGATIVE
NITRITE UR QL STRIP.AUTO: NEGATIVE
PH UR STRIP: 6 [PH] (ref 5–8)
PROT UR STRIP-MCNC: 30 MG/DL
RBC #/AREA URNS HPF: NORMAL /HPF (ref 0–5)
SP GR UR REFRACTOMETRY: 1.02 (ref 1–1.03)
UROBILINOGEN UR QL STRIP.AUTO: 1 EU/DL (ref 0.2–1)
WBC URNS QL MICRO: NORMAL /HPF (ref 0–4)

## 2021-07-30 PROCEDURE — 99284 EMERGENCY DEPT VISIT MOD MDM: CPT

## 2021-07-30 PROCEDURE — 51702 INSERT TEMP BLADDER CATH: CPT

## 2021-07-30 PROCEDURE — 51798 US URINE CAPACITY MEASURE: CPT

## 2021-07-30 PROCEDURE — 82962 GLUCOSE BLOOD TEST: CPT

## 2021-07-30 PROCEDURE — 81001 URINALYSIS AUTO W/SCOPE: CPT

## 2021-07-30 NOTE — PERIOP NOTES
Patient returned to PACU as he has not met the requirement to void prior to discharge and Phase 2 unit is closing. Provided with call bell, bed in low position. Urinal at bedside. Denies pain. Lap sites clean, dry and intact. 1800 Patient denies pain. Dressed and able to walk around unit on discharge side. 1845 Ambulated to BR. Voided 175 ml dark yellow urine.

## 2021-07-30 NOTE — ED PROVIDER NOTES
77-year-old male history of diabetes, hyperlipidemia, abdominal aortic aneurysm, status post robotic surgery for hernia repair yesterday presents for evaluation of possible urinary retention. Patient had prior episode of urinary retention following surgical procedure. Had surgery yesterday and was discharged home in good condition. He reports urine output of about 200 cc following surgery. Unable to void overnight and just had minimal urine output this morning. Presents with sensation of urgency. Otherwise recovering well from the surgical procedure with the exception of mild sore throat post intubation. Checked his glucose this morning got a low value at 31. Drank some juice before coming into the ED. Otherwise feels at baseline           Past Medical History:   Diagnosis Date    AAA (abdominal aortic aneurysm) (HCC)     Allergic rhinitis     Borderline diabetes mellitus     BPH without obstruction/lower urinary tract symptoms     Diabetes (HCC)     Dyslipidemia     GERD (gastroesophageal reflux disease)     Impaired fasting glucose     Lumbago     Microscopic hematuria     Personal history of other diseases of digestive system     Pinched nerve     Unspecified essential hypertension        Past Surgical History:   Procedure Laterality Date    HX KNEE REPLACEMENT Bilateral 04,05    HX LIPOMA RESECTION      back    HX LYMPHADENECTOMY  2012    HX ORTHOPAEDIC      bilateral carpel tunnel         History reviewed. No pertinent family history.     Social History     Socioeconomic History    Marital status:      Spouse name: Not on file    Number of children: Not on file    Years of education: Not on file    Highest education level: Not on file   Occupational History    Not on file   Tobacco Use    Smoking status: Current Some Day Smoker     Types: Cigars    Smokeless tobacco: Never Used    Tobacco comment: cigar rarely   Vaping Use    Vaping Use: Never used   Substance and Sexual Activity    Alcohol use: Yes     Comment: Occ    Drug use: No    Sexual activity: Yes     Partners: Female   Other Topics Concern    Not on file   Social History Narrative    Not on file     Social Determinants of Health     Financial Resource Strain:     Difficulty of Paying Living Expenses:    Food Insecurity:     Worried About Running Out of Food in the Last Year:     [address] in the Last Year:    Transportation Needs:     Lack of Transportation (Medical):  Lack of Transportation (Non-Medical):    Physical Activity:     Days of Exercise per Week:     Minutes of Exercise per Session:    Stress:     Feeling of Stress :    Social Connections:     Frequency of Communication with Friends and Family:     Frequency of Social Gatherings with Friends and Family:     Attends Hindu Services:     Active Member of Clubs or Organizations:     Attends Club or Organization Meetings:     Marital Status:    Intimate Partner Violence:     Fear of Current or Ex-Partner:     Emotionally Abused:     Physically Abused:     Sexually Abused: ALLERGIES: Patient has no known allergies. Review of Systems   Constitutional: Negative for fever. Gastrointestinal: Negative for abdominal pain. Genitourinary: Positive for difficulty urinating. All other systems reviewed and are negative. Vitals:    07/30/21 0731   BP: (!) 150/97   Pulse: 88   Resp: 18   Temp: 98.8 °F (37.1 °C)   SpO2: 97%            Physical Exam  Vitals and nursing note reviewed. Constitutional:       General: He is not in acute distress. Appearance: He is well-developed. HENT:      Head: Normocephalic and atraumatic. Eyes:      General: No scleral icterus. Cardiovascular:      Rate and Rhythm: Normal rate. Pulmonary:      Effort: Pulmonary effort is normal.   Abdominal:      Comments: Abdomen is soft and nondistended. Bladder is not palpable above the pubic brim.   Incision sites with Band-Aids and no drainage. Skin:     General: Skin is warm and dry. Neurological:      Mental Status: He is alert and oriented to person, place, and time. Recent Results (from the past 12 hour(s))   GLUCOSE, POC    Collection Time: 07/30/21  7:38 AM   Result Value Ref Range    Glucose (POC) 116 (H) 70 - 110 mg/dL   URINALYSIS W/ RFLX MICROSCOPIC    Collection Time: 07/30/21  7:57 AM   Result Value Ref Range    Color YELLOW      Appearance CLEAR      Specific gravity 1.016 1.005 - 1.030      pH (UA) 6.0 5.0 - 8.0      Protein 30 (A) NEG mg/dL    Glucose Negative NEG mg/dL    Ketone Negative NEG mg/dL    Bilirubin Negative NEG      Blood Negative NEG      Urobilinogen 1.0 0.2 - 1.0 EU/dL    Nitrites Negative NEG      Leukocyte Esterase Negative NEG     URINE MICROSCOPIC ONLY    Collection Time: 07/30/21  7:57 AM   Result Value Ref Range    WBC 0 to 3 0 - 4 /hpf    RBC 0 to 3 0 - 5 /hpf    Epithelial cells FEW 0 - 5 /lpf    Bacteria Negative NEG /hpf         MDM  Number of Diagnoses or Management Options  Decreased urine output  Status post hernia repair  Diagnosis management comments: Impression: Bladder scan with low residual urine. Catheter placed in less than 150 cc of urine out. Urinalysis sent normal.  Patient may be having some mild bladder spasm related to the anesthesia but does not have significant urinary retention currently. Catheter removed. Precautions reviewed. Procedures    Diagnosis:   1. Decreased urine output    2.  Status post hernia repair          Disposition: home    Follow-up Information     Follow up With Specialties Details Why Contact Roney Conti MD Family Medicine Schedule an appointment as soon as possible for a visit  As needed, for recheck of ongoing symptoms [address] [facility] [address] [place] [address]  EMERGENCY DEPT Emergency Medicine  As needed, If symptoms worsen [id number] [facility]  [phone] Patient's Medications   Start Taking    No medications on file   Continue Taking    ACETAMINOPHEN (TYLENOL) 325 MG TABLET    Take 2 Tablets by mouth every six (6) hours. Indications: pain    ASCORBIC ACID (VITAMIN C) 500 MG TABLET    Take 500 mg by mouth nightly. ASPIRIN 81 MG TABLET    Take 81 mg by mouth. ATORVASTATIN (LIPITOR) 40 MG TABLET    Take 40 mg by mouth nightly. BISACODYL 5 MG TAB    Take 5 mg by mouth daily. CLONIDINE HCL (CATAPRES) 0.1 MG TABLET    Take 0.1 mg by mouth two (2) times a day. COQ10, UBIQUINOL, PO    Take  by mouth daily. DICLOFENAC (VOLTAREN) 1 % GEL    Apply 2 g to affected area four (4) times daily as needed. DOCUSATE SODIUM (COLACE) 100 MG CAPSULE    Take 1 Capsule by mouth two (2) times a day for 90 days. FERROUS SULFATE 325 MG (65 MG IRON) TABLET    Take  by mouth Daily (before breakfast). FEXOFENADINE (ALLEGRA) 180 MG TABLET    Take 180 mg by mouth daily as needed. FLUTICASONE PROPIONATE (FLONASE) 50 MCG/ACTUATION NASAL SPRAY    1 Enderlin daily as needed. JANUVIA 50 MG TABLET    Take 50 mg by mouth daily. METFORMIN (GLUCOPHAGE) 1,000 MG TABLET    Take 1,000 mg by mouth two (2) times daily (with meals). MONTELUKAST (SINGULAIR) 10 MG TABLET    Take 10 mg by mouth nightly as needed. Takes 1/2 tab as needed    MULTIVITAMIN (ONE A DAY) TABLET    Take 1 Tablet by mouth daily. NAPROXEN (NAPROSYN) 500 MG TABLET    Take 500 mg by mouth two (2) times daily as needed. NAPROXEN SODIUM (ALEVE) 220 MG CAP    Take  by mouth as needed. OXYCODONE IR (ROXICODONE) 5 MG IMMEDIATE RELEASE TABLET    Take 1 Tablet by mouth every four (4) hours as needed for Pain for up to 3 days. Max Daily Amount: 30 mg. PANTOPRAZOLE (PROTONIX) 40 MG TABLET    Take 40 mg by mouth daily. TELMISARTAN-HYDROCHLOROTHIAZIDE (MICARDIS HCT) 80-25 MG PER TABLET    1 Tablet daily.    These Medications have changed    No medications on file   Stop Taking    No medications on file

## 2021-07-30 NOTE — ED TRIAGE NOTES
Patient states he had hernia surgery yesterday and has been unable to urinate. He states his BS was 31 this morning. He drank some juice.

## 2021-07-30 NOTE — DISCHARGE INSTRUCTIONS
5 ounces of urine in the bladder. Urinalysis is negative. Monitor urine output today. Return if unable to void for 12 hours or more.

## 2021-07-30 NOTE — ANESTHESIA POSTPROCEDURE EVALUATION
Procedure(s):  ROBOTIC ASSISTED LAPAROSCOPIC RIGHT INGUINAL HERNIA REPAIR WITH MESH. general    Anesthesia Post Evaluation      Multimodal analgesia: multimodal analgesia used between 6 hours prior to anesthesia start to PACU discharge  Patient location during evaluation: bedside  Patient participation: complete - patient participated  Level of consciousness: awake  Pain management: adequate  Airway patency: patent  Anesthetic complications: no  Cardiovascular status: stable  Respiratory status: acceptable  Hydration status: acceptable  Post anesthesia nausea and vomiting:  controlled      INITIAL Post-op Vital signs:   Vitals Value Taken Time   BP 91/57 07/29/21 1602   Temp 36.8 °C (98.3 °F) 07/29/21 1552   Pulse 71 07/29/21 1605   Resp 18 07/29/21 1605   SpO2 98 % 07/29/21 1606   Vitals shown include unvalidated device data.

## 2021-08-11 ENCOUNTER — OFFICE VISIT (OUTPATIENT)
Dept: SURGERY | Age: 76
End: 2021-08-11
Payer: MEDICARE

## 2021-08-11 VITALS
BODY MASS INDEX: 32.48 KG/M2 | SYSTOLIC BLOOD PRESSURE: 126 MMHG | HEART RATE: 85 BPM | TEMPERATURE: 97.1 F | OXYGEN SATURATION: 98 % | WEIGHT: 232 LBS | RESPIRATION RATE: 18 BRPM | HEIGHT: 71 IN | DIASTOLIC BLOOD PRESSURE: 82 MMHG

## 2021-08-11 DIAGNOSIS — Z09 POSTOPERATIVE EXAMINATION: Primary | ICD-10-CM

## 2021-08-11 PROCEDURE — 99024 POSTOP FOLLOW-UP VISIT: CPT | Performed by: SURGERY

## 2021-08-11 NOTE — PROGRESS NOTES
Domenico Wilson is a 76 y.o. male  Chief Complaint   Patient presents with    Post OP Follow Up     inguinal hernia repair with mesh 7/29/21     Pt c/o constipation and groin pain about a 7 out of 10.

## 2021-08-13 NOTE — PROGRESS NOTES
[facility]  General Surgery    Name: Mariah Simons MRN: [id number]   : 1945 [facility]   Date: 2021 Admission Date: No admission date for patient encounter. Subjective:  Patient presents today 2 weeks out from robot-assisted laparoscopic right inguinal hernia repair with mesh. He is doing well. He has no complaints. Objective:  Vitals:    21 0906   BP: 126/82   Pulse: 85   Resp: 18   Temp: 97.1 °F (36.2 °C)   TempSrc: Temporal   SpO2: 98%   Weight: 105.2 kg (232 lb)   Height: 5' 11\" (1.803 m)       Physical Exam:    General: Awake and alert, oriented x4, no apparent distress   Abdomen: abdomen is soft with minimal tenderness incisions healing well. No masses, organomegaly or guarding    Current Medications:  Current Outpatient Medications   Medication Sig Dispense Refill    acetaminophen (TYLENOL) 325 mg tablet Take 2 Tablets by mouth every six (6) hours. Indications: pain 42 Tablet 0    docusate sodium (COLACE) 100 mg capsule Take 1 Capsule by mouth two (2) times a day for 90 days. 60 Capsule 2    COQ10, UBIQUINOL, PO Take  by mouth daily.  ferrous sulfate 325 mg (65 mg iron) tablet Take  by mouth Daily (before breakfast).  naproxen sodium (Aleve) 220 mg cap Take  by mouth as needed.  multivitamin (ONE A DAY) tablet Take 1 Tablet by mouth daily.  fluticasone propionate (FLONASE) 50 mcg/actuation nasal spray 1 Spray daily as needed.  pantoprazole (PROTONIX) 40 mg tablet Take 40 mg by mouth daily.  telmisartan-hydroCHLOROthiazide (MICARDIS HCT) 80-25 mg per tablet 1 Tablet daily.  Januvia 50 mg tablet Take 50 mg by mouth daily.  naproxen (NAPROSYN) 500 mg tablet Take 500 mg by mouth two (2) times daily as needed.  fexofenadine (ALLEGRA) 180 mg tablet Take 180 mg by mouth daily as needed.  diclofenac (VOLTAREN) 1 % gel Apply 2 g to affected area four (4) times daily as needed.       cloNIDine HCL (CATAPRES) 0.1 mg tablet Take 0.1 mg by mouth two (2) times a day.  metformin (GLUCOPHAGE) 1,000 mg tablet Take 1,000 mg by mouth two (2) times daily (with meals).  atorvastatin (LIPITOR) 40 mg tablet Take 40 mg by mouth nightly.  montelukast (SINGULAIR) 10 mg tablet Take 10 mg by mouth nightly as needed. Takes 1/2 tab as needed      ascorbic acid (VITAMIN C) 500 mg tablet Take 500 mg by mouth nightly.  aspirin 81 mg tablet Take 81 mg by mouth. Chart and notes reviewed. Data reviewed. I have evaluated and examined the patient.         IMPRESSION:   · Patient doing well 2 weeks out from right inguinal hernia repair robot-assisted laparoscopic      PLAN:/DISCUSION:   · Lifting restrictions below 50 pounds no strenuous activity  · May walk  · Follow-up in 4 weeks        Mya Muniz MD

## 2021-09-15 ENCOUNTER — OFFICE VISIT (OUTPATIENT)
Dept: SURGERY | Age: 76
End: 2021-09-15
Payer: MEDICARE

## 2021-09-15 VITALS
RESPIRATION RATE: 20 BRPM | WEIGHT: 225 LBS | OXYGEN SATURATION: 97 % | SYSTOLIC BLOOD PRESSURE: 114 MMHG | DIASTOLIC BLOOD PRESSURE: 70 MMHG | TEMPERATURE: 97.6 F | HEART RATE: 90 BPM | BODY MASS INDEX: 31.5 KG/M2 | HEIGHT: 71 IN

## 2021-09-15 DIAGNOSIS — Z09 POSTOPERATIVE EXAMINATION: Primary | ICD-10-CM

## 2021-09-15 PROCEDURE — 99024 POSTOP FOLLOW-UP VISIT: CPT | Performed by: SURGERY

## 2021-09-15 RX ORDER — SULINDAC 200 MG/1
200 TABLET ORAL 2 TIMES DAILY
COMMUNITY
Start: 2021-09-01

## 2021-09-15 RX ORDER — CARBOXYMETHYLCELLULOSE SODIUM 5 MG/ML
SOLUTION/ DROPS OPHTHALMIC
COMMUNITY
Start: 2021-08-05

## 2021-09-15 NOTE — PROGRESS NOTES
Chief Complaint   Patient presents with    Post OP Follow Up      right inguinal hernia repair with mesh. 1. Have you been to the ER, urgent care clinic since your last visit? Hospitalized since your last visit? No    2. Have you seen or consulted any other health care providers outside of the [address] [place] [address] since your last visit? Include any pap smears or colon screening.  Yes pcp

## 2021-09-16 NOTE — PROGRESS NOTES
[facility]  General Surgery    Name: Romel Hudson MRN: [id number]   : 1945 [facility]   Date: 2021 Admission Date: No admission date for patient encounter. Subjective:  Patient presents today 6 weeks out from robot-assisted laparoscopic right inguinal hernia repair with mesh. He has no complaints. Objective:  Vitals:    09/15/21 1304   BP: 114/70   Pulse: 90   Resp: 20   Temp: 97.6 °F (36.4 °C)   SpO2: 97%   Weight: 102.1 kg (225 lb)   Height: 5' 11\" (1.803 m)       Physical Exam:    General: Awake and alert, oriented x4, no apparent distress   Abdomen: abdomen is soft without tenderness. Incision(s) are C/D/I. No evidence of hernia recurrence with cough. No masses, organomegaly or guarding    Current Medications:  Current Outpatient Medications   Medication Sig Dispense Refill    sulindac (CLINORIL) 200 mg tablet Take 200 mg by mouth two (2) times a day.  Refresh Tears 0.5 % drop ophthalmic solution       acetaminophen (TYLENOL) 325 mg tablet Take 2 Tablets by mouth every six (6) hours. Indications: pain 42 Tablet 0    COQ10, UBIQUINOL, PO Take  by mouth daily.  ferrous sulfate 325 mg (65 mg iron) tablet Take  by mouth Daily (before breakfast).  multivitamin (ONE A DAY) tablet Take 1 Tablet by mouth daily.  fluticasone propionate (FLONASE) 50 mcg/actuation nasal spray 1 Spray daily as needed.  pantoprazole (PROTONIX) 40 mg tablet Take 40 mg by mouth daily.  telmisartan-hydroCHLOROthiazide (MICARDIS HCT) 80-25 mg per tablet 1 Tablet daily.  Januvia 50 mg tablet Take 50 mg by mouth daily.  fexofenadine (ALLEGRA) 180 mg tablet Take 180 mg by mouth daily as needed.  diclofenac (VOLTAREN) 1 % gel Apply 2 g to affected area four (4) times daily as needed.  cloNIDine HCL (CATAPRES) 0.1 mg tablet Take 0.1 mg by mouth two (2) times a day.       metformin (GLUCOPHAGE) 1,000 mg tablet Take 1,000 mg by mouth two (2) times daily (with meals).  atorvastatin (LIPITOR) 40 mg tablet Take 40 mg by mouth nightly.  montelukast (SINGULAIR) 10 mg tablet Take 10 mg by mouth nightly as needed. Takes 1/2 tab as needed      ascorbic acid (VITAMIN C) 500 mg tablet Take 500 mg by mouth nightly.  aspirin 81 mg tablet Take 81 mg by mouth.  naproxen sodium (Aleve) 220 mg cap Take  by mouth as needed. (Patient not taking: Reported on 9/15/2021)      naproxen (NAPROSYN) 500 mg tablet Take 500 mg by mouth two (2) times daily as needed. (Patient not taking: Reported on 9/15/2021)         Chart and notes reviewed. Data reviewed. I have evaluated and examined the patient.         IMPRESSION:   · Patient doing well      PLAN:/DISCUSION:   · Follow-up as needed        Porter Gann MD

## (undated) DEVICE — SYR 10ML LUER LOK 1/5ML GRAD --

## (undated) DEVICE — INTENDED FOR TISSUE SEPARATION, AND OTHER PROCEDURES THAT REQUIRE A SHARP SURGICAL BLADE TO PUNCTURE OR CUT.: Brand: BARD-PARKER ®  SAFETY SCALPED

## (undated) DEVICE — COLUMN DRAPE

## (undated) DEVICE — TIP COVER ACCESSORY

## (undated) DEVICE — BLADELESS OBTURATOR: Brand: WECK VISTA

## (undated) DEVICE — TRAY,URINE METER,100% SILICONE,16FR10ML: Brand: MEDLINE

## (undated) DEVICE — STERILE POLYISOPRENE POWDER-FREE SURGICAL GLOVES: Brand: PROTEXIS

## (undated) DEVICE — SET SUCT IRR TIP DISP STRYKEFLOW2

## (undated) DEVICE — GLOVE SURG SZ 8 L11.77IN FNGR THK9.8MIL STRW LTX POLYMER

## (undated) DEVICE — ELECTRO LUBE IS A SINGLE PATIENT USE DEVICE THAT IS INTENDED TO BE USED ON ELECTROSURGICAL ELECTRODES TO REDUCE STICKING.: Brand: KEY SURGICAL ELECTRO LUBE

## (undated) DEVICE — ARM DRAPE

## (undated) DEVICE — REM POLYHESIVE ADULT PATIENT RETURN ELECTRODE: Brand: VALLEYLAB

## (undated) DEVICE — NEEDLE HYPO 25GA L1.5IN BVL ORIENTED ECLIPSE

## (undated) DEVICE — GARMENT,MEDLINE,DVT,INT,CALF,FOAM,MED: Brand: MEDLINE

## (undated) DEVICE — CLIP INT M L POLYMER LOK LIG HEM O LOK

## (undated) DEVICE — DRAPE TOWEL: Brand: CONVERTORS

## (undated) DEVICE — BANDAGE ADH W0.75XL3IN UNIV WVN FAB NAT GEN USE STRP N ADH

## (undated) DEVICE — Device

## (undated) DEVICE — SUTURE V-LOC 180 SZ 0 L9IN ABSRB GRN GS-21 L37MM 1/2 CIR VLOCL0346

## (undated) DEVICE — 48" PROBE COVER W/GEL, ULTRASOUND, STERILE: Brand: SITE-RITE

## (undated) DEVICE — COVER LT HNDL BLU STRL -- MEDICHOICE

## (undated) DEVICE — SUTURE VCRL SZ 2-0 L27IN ABSRB VLT L26MM CT-2 1/2 CIR J333H

## (undated) DEVICE — 3M™ IOBAN™ 2 ANTIMICROBIAL INCISE DRAPE 6651EZ: Brand: IOBAN™ 2

## (undated) DEVICE — SOLUTION IV 1000ML 0.9% SOD CHL

## (undated) DEVICE — KIT CLN UP BON SECOURS MARYV

## (undated) DEVICE — SEAL UNIV 5-8MM DISP BX/10 -- DA VINCI XI - SNGL USE

## (undated) DEVICE — BLANKET WRM W40.2XL55.9IN IORT LO BODY + MISTRAL AIR

## (undated) DEVICE — STRIP,CLOSURE,WOUND,MEDI-STRIP,1/2X4: Brand: MEDLINE

## (undated) DEVICE — SUTURE MCRYL SZ 4-0 L27IN ABSRB UD L24MM PS-1 3/8 CIR PRIM Y935H

## (undated) DEVICE — TRAY PREP DRY W/ PREM GLV 2 APPL 6 SPNG 2 UNDPD 1 OVERWRAP

## (undated) DEVICE — MAYO STAND COVER: Brand: CONVERTORS

## (undated) DEVICE — MASTISOL ADHESIVE LIQ 2/3ML

## (undated) DEVICE — GLOVE SURG BIOGEL 8.0 STRL -- SKINSENSE

## (undated) DEVICE — ICE PACK 11X14 IN